# Patient Record
Sex: FEMALE | Race: WHITE | NOT HISPANIC OR LATINO | Employment: FULL TIME | URBAN - METROPOLITAN AREA
[De-identification: names, ages, dates, MRNs, and addresses within clinical notes are randomized per-mention and may not be internally consistent; named-entity substitution may affect disease eponyms.]

---

## 2017-06-07 ENCOUNTER — EXTERNAL ORDER RESULTS (OUTPATIENT)
Dept: HEALTH INFORMATION MANAGEMENT | Facility: CLINIC | Age: 27
End: 2017-06-07

## 2017-06-07 LAB — PAP SMEAR - HIM PATIENT REPORTED: NEGATIVE

## 2017-09-19 ENCOUNTER — OFFICE VISIT (OUTPATIENT)
Dept: FAMILY MEDICINE | Facility: CLINIC | Age: 27
End: 2017-09-19
Payer: COMMERCIAL

## 2017-09-19 VITALS
SYSTOLIC BLOOD PRESSURE: 132 MMHG | OXYGEN SATURATION: 98 % | HEART RATE: 109 BPM | HEIGHT: 65 IN | BODY MASS INDEX: 33.38 KG/M2 | WEIGHT: 200.38 LBS | DIASTOLIC BLOOD PRESSURE: 94 MMHG | TEMPERATURE: 98.2 F

## 2017-09-19 DIAGNOSIS — Z30.41 ENCOUNTER FOR SURVEILLANCE OF CONTRACEPTIVE PILLS: Primary | ICD-10-CM

## 2017-09-19 DIAGNOSIS — E66.09 NON MORBID OBESITY DUE TO EXCESS CALORIES: ICD-10-CM

## 2017-09-19 PROBLEM — Z13.6 CARDIOVASCULAR SCREENING; LDL GOAL LESS THAN 160: Status: ACTIVE | Noted: 2017-09-19

## 2017-09-19 PROCEDURE — 99203 OFFICE O/P NEW LOW 30 MIN: CPT | Performed by: NURSE PRACTITIONER

## 2017-09-19 RX ORDER — DROSPIRENONE AND ETHINYL ESTRADIOL 0.02-3(28)
1 KIT ORAL DAILY
Qty: 84 TABLET | Refills: 3 | Status: SHIPPED | OUTPATIENT
Start: 2017-09-19 | End: 2017-09-29

## 2017-09-19 RX ORDER — DROSPIRENONE AND ETHINYL ESTRADIOL 0.02-3(28)
1 KIT ORAL DAILY
COMMUNITY
End: 2018-08-28

## 2017-09-19 NOTE — Clinical Note
Please abstract the following data from this visit with this patient into the appropriate field in Epic:  Pap smear done on this date: 6/7/17 (approximately), by this group: JERAMIE garay, results were Normal.

## 2017-09-19 NOTE — PROGRESS NOTES
"  SUBJECTIVE:   Kymberly Lugo is a 27 year old female who presents to clinic today for the following health issues:    Medication Followup of Birth control     Taking Medication as prescribed: yes    Side Effects:  None    Medication Helping Symptoms:  yes     Recently moved here from [facility] for husbands job.  Looking for a job as a  in [facility].    Has been on generic Elier for PCOS and feeling much better on this.  Wants to continue this pill.    Physical and pap done 3 [place] ago [place]  MILLY done.     Overweight.  No family or personal history of early MI, CVA or clot.    Non smoker.         Problem list and histories reviewed & adjusted, as indicated.  Additional history: as documented    Reviewed and updated as needed this visit by clinical staffTobacco  Allergies  Meds  Problems  Med Hx  Surg Hx  Fam Hx  Soc Hx        Reviewed and updated as needed this visit by Provider  Tobacco  Allergies  Meds  Problems  Med Hx  Surg Hx  Fam Hx  Soc Hx          ROS:  Constitutional, HEENT, cardiovascular, pulmonary, gi and gu systems are negative, except as otherwise noted.      OBJECTIVE:   BP (!) 132/94 (BP Location: Right arm, Patient Position: Sitting, Cuff Size: Adult Large)  Pulse 109  Temp 98.2  F (36.8  C) (Oral)  Ht 5' 4.5\" (1.638 m)  Wt 200 lb 6 oz (90.9 kg)  LMP  (LMP Unknown)  SpO2 98%  Breastfeeding? No  BMI 33.86 kg/m2  Body mass index is 33.86 kg/(m^2).  GENERAL: healthy, alert and no distress  RESP: lungs clear to auscultation - no rales, rhonchi or wheezes  CV: regular rate and rhythm, normal S1 S2, no S3 or S4, no murmur, click or rub, no peripheral edema and peripheral pulses strong  MS: no gross musculoskeletal defects noted, no edema  SKIN: no suspicious lesions or rashes      ASSESSMENT/PLAN:         ICD-10-CM    1. Encounter for surveillance of contraceptive pills Z30.41 drospirenone-ethinyl estradiol (ELIER) 3-0.02 MG per tablet   2. Non morbid obesity due to excess " calories E66.09      .The use of the oral contraceptive has been fully discussed with the patient.   The patient has been told of the more serious potential side effects such as MI, stroke, and deep vein thrombosis, all of which are very unlikely.  She has been asked to report any signs of such serious problems immediately.  She should back up the pill with a condom during the first cycle.  She has been given written literature regarding use and side effects of oral contraceptives. The need for additional protection, such as a condom, to prevent exposure to sexually transmitted diseases has also been discussed- the patient has been clearly reminded that OCP's cannot protect her against diseases such as HIV and others. She understands and wishes to take the medication as prescribed.      See Patient Instructions    CRISTIAN Pandya CNP  [facility]

## 2017-09-19 NOTE — NURSING NOTE
"Chief Complaint   Patient presents with     Contraception       Initial BP (!) 132/94 (BP Location: Right arm, Patient Position: Sitting, Cuff Size: Adult Large)  Pulse 109  Temp 98.2  F (36.8  C) (Oral)  Ht 5' 4.5\" (1.638 m)  Wt 200 lb 6 oz (90.9 kg)  LMP  (LMP Unknown)  SpO2 98%  Breastfeeding? No  BMI 33.86 kg/m2 Estimated body mass index is 33.86 kg/(m^2) as calculated from the following:    Height as of this encounter: 5' 4.5\" (1.638 m).    Weight as of this encounter: 200 lb 6 oz (90.9 kg).  Medication Reconciliation: complete     Meagan Saleh MA      "

## 2017-09-19 NOTE — MR AVS SNAPSHOT
"              After Visit Summary   2017    Kymberly Lugo    MRN: [id number]           Patient Information     Date Of Birth          1990        Visit Information        Provider Department      2017 9:20 AM Barbara Fitzpatrick APRN CNP [facility]        Today's Diagnoses     Encounter for surveillance of contraceptive pills    -  1    Non morbid obesity due to excess calories           Follow-ups after your visit        Who to contact     If you have questions or need follow up information about today's clinic visit or your schedule please contact [facility] directly at [phone].  Normal or non-critical lab and imaging results will be communicated to you by MyChart, letter or phone within 4 business days after the clinic has received the results. If you do not hear from us within 7 days, please contact the clinic through Sandboxhart or phone. If you have a critical or abnormal lab result, we will notify you by phone as soon as possible.  Submit refill requests through [employer] or call your pharmacy and they will forward the refill request to us. Please allow 3 business days for your refill to be completed.          Additional Information About Your Visit        MyChart Information     [employer] lets you send messages to your doctor, view your test results, renew your prescriptions, schedule appointments and more. To sign up, go to [email or web address] . Click on \"Log in\" on the left side of the screen, which will take you to the Welcome page. Then click on \"Sign up Now\" on the right side of the page.     You will be asked to enter the access code listed below, as well as some personal information. Please follow the directions to create your username and password.     Your access code is: [email or web address]  Expires: 2017  9:52 AM     Your access code will  in 90 days. If you need help or a new code, please call your [facility] or [phone].   " "     Care EveryWhere ID     This is your Care EveryWhere ID. This could be used by other organizations to access your [facility] medical records  [id number]        Your Vitals Were     Pulse Temperature Height Last Period Pulse Oximetry Breastfeeding?    109 98.2  F (36.8  C) (Oral) 5' 4.5\" (1.638 m) (LMP Unknown) 98% No    BMI (Body Mass Index)                   33.86 kg/m2            Blood Pressure from Last 3 Encounters:   09/19/17 (!) 132/94    Weight from Last 3 Encounters:   09/19/17 200 lb 6 oz (90.9 kg)              Today, you had the following     No orders found for display         Today's Medication Changes          These changes are accurate as of: 9/19/17  9:52 AM.  If you have any questions, ask your nurse or doctor.               These medicines have changed or have updated prescriptions.        Dose/Directions    * drospirenone-ethinyl estradiol 3-0.02 MG per tablet   Commonly known as:  ELIER   This may have changed:  Another medication with the same name was added. Make sure you understand how and when to take each.   Changed by:  Barbara Fitzpatrick APRN CNP        Dose:  1 tablet   Take 1 tablet by mouth daily   Refills:  0       * drospirenone-ethinyl estradiol 3-0.02 MG per tablet   Commonly known as:  ELIER   This may have changed:  You were already taking a medication with the same name, and this prescription was added. Make sure you understand how and when to take each.   Used for:  Encounter for surveillance of contraceptive pills   Changed by:  Barbara Fitzpatrick APRN CNP        Dose:  1 tablet   Take 1 tablet by mouth daily   Quantity:  84 tablet   Refills:  3       * Notice:  This list has 2 medication(s) that are the same as other medications prescribed for you. Read the directions carefully, and ask your doctor or other care provider to review them with you.         Where to get your medicines      These medications were sent to [facility] - [place], [place] - [address]  [address] [place] [address]     Phone:  [phone]     drospirenone-ethinyl estradiol 3-0.02 MG per tablet                Primary Care Provider Office Phone # Fax #    CRISTIAN Pandya [phone]       [address] [place]  [facility] [address]        Equal Access to Services     FERNANDO CHAVARRIA : Hadii aad ku hadasho Soomaali, waaxda luqadaha, qaybta kaalmada adeegyada, waxay idiin hayaan adeeg kharash la'ieshan . So [facility] [phone].    ATENCIÓN: Si melanie carreon, tiene a baptiste disposición servicios gratuitos de asistencia lingüística. Ll[facility] [phone].    We comply with applicable federal civil rights laws and [place] laws. We do not discriminate on the basis of race, color, national origin, age, disability sex, sexual orientation or gender identity.            Thank you!     Thank you for choosing [facility]  for your care. Our goal is always to provide you with excellent care. Hearing back from our patients is one way we can continue to improve our services. Please take a few minutes to complete the written survey that you may receive in the mail after your visit with us. Thank you!             Your Updated Medication List - Protect others around you: Learn how to safely use, store and throw away your medicines at www.disposemymeds.org.          This list is accurate as of: 9/19/17  9:52 AM.  Always use your most recent med list.                   Brand Name Dispense Instructions for use Diagnosis    * drospirenone-ethinyl estradiol 3-0.02 MG per tablet    ELIER     Take 1 tablet by mouth daily        * drospirenone-ethinyl estradiol 3-0.02 MG per tablet    ELIER    84 tablet    Take 1 tablet by mouth daily    Encounter for surveillance of contraceptive pills       * Notice:  This list has 2 medication(s) that are the same as other medications prescribed for you. Read the directions carefully, and ask your doctor or other care provider to review  them with you.

## 2017-09-29 DIAGNOSIS — Z30.41 ENCOUNTER FOR SURVEILLANCE OF CONTRACEPTIVE PILLS: ICD-10-CM

## 2017-09-29 NOTE — TELEPHONE ENCOUNTER
[facility] will not transfer Rx's.  They require a new Rx.  Thanks    Medication Detail      Disp Refills Start End TIEN   drospirenone-ethinyl estradiol (ELIER) 3-0.02 MG per tablet 84 tablet 3 9/19/2017  --   Sig: Take 1 tablet by mouth daily       Last Office Visit with F[facility]AGHU, [facility]ANNA or [facility] prescribing provider: 9-19-17

## 2017-10-02 RX ORDER — DROSPIRENONE AND ETHINYL ESTRADIOL 0.02-3(28)
1 KIT ORAL DAILY
Qty: 84 TABLET | Refills: 3 | Status: SHIPPED | OUTPATIENT
Start: 2017-10-02 | End: 2018-08-12

## 2017-10-02 NOTE — TELEPHONE ENCOUNTER
Prescription approved per [facility] Refill Protocol.  Meaghan Dominguez RN

## 2017-10-10 ENCOUNTER — TELEPHONE (OUTPATIENT)
Dept: FAMILY MEDICINE | Facility: CLINIC | Age: 27
End: 2017-10-10

## 2017-10-10 NOTE — TELEPHONE ENCOUNTER
[facility] wants to switch to doing everything online. Sent to us a form on 09/29 for Barbara to fill out and fax back, please advise. Thank you!

## 2017-10-11 NOTE — TELEPHONE ENCOUNTER
Pt states these forms were just refaxed. If we do not receive them we may call [facility] at [phone] opt 2.    Alina Black  Patient Representative

## 2017-10-11 NOTE — TELEPHONE ENCOUNTER
Have not seen any forms from [employer]. Called and spoke with patient to have her resend the forms via fax.     Madeleine Coelho MA

## 2017-10-12 NOTE — TELEPHONE ENCOUNTER
Message below is very vague. I called [facility]. Form appears is a request for medication [phone].  I was transferred several times.  I called in Rx for Christiana.    Radha Neville MA

## 2018-03-01 ENCOUNTER — OFFICE VISIT (OUTPATIENT)
Dept: FAMILY MEDICINE | Facility: CLINIC | Age: 28
End: 2018-03-01
Payer: COMMERCIAL

## 2018-03-01 VITALS
HEART RATE: 145 BPM | SYSTOLIC BLOOD PRESSURE: 126 MMHG | BODY MASS INDEX: 31.32 KG/M2 | DIASTOLIC BLOOD PRESSURE: 86 MMHG | OXYGEN SATURATION: 98 % | TEMPERATURE: 99.2 F | WEIGHT: 188 LBS | HEIGHT: 65 IN

## 2018-03-01 DIAGNOSIS — J06.9 VIRAL URI: ICD-10-CM

## 2018-03-01 DIAGNOSIS — J45.21 MILD INTERMITTENT ASTHMA WITH EXACERBATION: Primary | ICD-10-CM

## 2018-03-01 PROCEDURE — 99214 OFFICE O/P EST MOD 30 MIN: CPT | Performed by: FAMILY MEDICINE

## 2018-03-01 RX ORDER — ALBUTEROL SULFATE 90 UG/1
2 AEROSOL, METERED RESPIRATORY (INHALATION) EVERY 6 HOURS
COMMUNITY
End: 2018-03-01

## 2018-03-01 RX ORDER — ALBUTEROL SULFATE 90 UG/1
2 AEROSOL, METERED RESPIRATORY (INHALATION) EVERY 6 HOURS
Qty: 1 INHALER | Refills: 3 | Status: SHIPPED | OUTPATIENT
Start: 2018-03-01 | End: 2020-03-20

## 2018-03-01 NOTE — PROGRESS NOTES
Subjective: Patient developed a mild cold on Monday, 3 days ago, and yesterday felt chest tightness, asthma symptoms, has a history of asthma and has an old  inhaler which did help.  Not much mucus is coming up.  She used to have very bad asthma in her teen years but seemed to outgrow it.  [place] the city in [place].    Objective: ENT is normal.  Neck is normal.  Lungs are clear but tight cough with forced expiration.  Heart with a pulse of 120 but she had Sudafed earlier today.    Assessment and plan: Asthma exacerbation due to a viral URI.  Albuterol as needed.  Avoid Sudafed because of the rapid heart rate which it appears to cause.  Follow-up with her regular doctor at a physical and discuss more about asthma.

## 2018-03-01 NOTE — MR AVS SNAPSHOT
"              After Visit Summary   3/1/2018    Kymberly Lugo    MRN: [id number]           Patient Information     Date Of Birth          1990        Visit Information        Provider Department      3/1/2018 3:00 PM Balta Cohn MD [facility]        Today's Diagnoses     Mild intermittent asthma with exacerbation    -  1    Viral URI           Follow-ups after your visit        Who to contact     If you have questions or need follow up information about today's clinic visit or your schedule please contact [facility] directly at [phone].  Normal or non-critical lab and imaging results will be communicated to you by DiversityDoctorhart, letter or phone within 4 business days after the clinic has received the results. If you do not hear from us within 7 days, please contact the clinic through [employer] or phone. If you have a critical or abnormal lab result, we will notify you by phone as soon as possible.  Submit refill requests through [employer] or call your pharmacy and they will forward the refill request to us. Please allow 3 business days for your refill to be completed.          Additional Information About Your Visit        MyChart Information     [employer] gives you secure access to your electronic health record. If you see a primary care provider, you can also send messages to your care team and make appointments. If you have questions, please call your primary care clinic.  If you do not have a primary care provider, please call [phone] and they will assist you.        Care EveryWhere ID     This is your Care EveryWhere ID. This could be used by other organizations to access your [facility] medical records  [id number]        Your Vitals Were     Pulse Temperature Height Last Period Pulse Oximetry BMI (Body Mass Index)    145 99.2  F (37.3  C) (Oral) 5' 5\" (1.651 m) 02/15/2018 98% 31.28 kg/m2       Blood Pressure from Last 3 Encounters:   03/01/18 126/86 "   09/19/17 (!) 132/94    Weight from Last 3 Encounters:   03/01/18 188 lb (85.3 kg)   09/19/17 200 lb 6 oz (90.9 kg)              Today, you had the following     No orders found for display         Where to get your medicines      These medications were sent to [facility] - [place], [place] - [address]  [address] [facility], [place] [address]     Phone:  [phone]     albuterol 108 (90 BASE) MCG/ACT Inhaler          Primary Care Provider Office Phone # Fax #    CRISTIAN Pandya [phone]       [address] [facility] [address]        Equal Access to Services     FERNANDO CHAVARRIA : Davonte llanoso Solis, waaxda luqadaha, qaybta kaalmada adeegyada, td skelton . So [facility] [phone].    ATENCIÓN: Si habla español, tiene a baptiste disposición servicios gratuitos de asistencia lingüística. Ll[facility] [phone].    We comply with applicable federal civil rights laws and [place] laws. We do not discriminate on the basis of race, color, national origin, age, disability, sex, sexual orientation, or gender identity.            Thank you!     Thank you for choosing [facility]  for your care. Our goal is always to provide you with excellent care. Hearing back from our patients is one way we can continue to improve our services. Please take a few minutes to complete the written survey that you may receive in the mail after your visit with us. Thank you!             Your Updated Medication List - Protect others around you: Learn how to safely use, store and throw away your medicines at www.disposemymeds.org.          This list is accurate as of 3/1/18  4:29 PM.  Always use your most recent med list.                   Brand Name Dispense Instructions for use Diagnosis    albuterol 108 (90 BASE) MCG/ACT Inhaler    PROAIR HFA/PROVENTIL HFA/VENTOLIN HFA    1 Inhaler    Inhale 2 puffs into the lungs every 6 hours    Mild  intermittent asthma with exacerbation       * drospirenone-ethinyl estradiol 3-0.02 MG per tablet    ELIER     Take 1 tablet by mouth daily        * drospirenone-ethinyl estradiol 3-0.02 MG per tablet    ELIER    84 tablet    Take 1 tablet by mouth daily    Encounter for surveillance of contraceptive pills       MULTIVITAMIN GUMMIES ADULT PO           VITAMIN D (CHOLECALCIFEROL) PO      Take by mouth daily        * Notice:  This list has 2 medication(s) that are the same as other medications prescribed for you. Read the directions carefully, and ask your doctor or other care provider to review them with you.

## 2018-03-01 NOTE — NURSING NOTE
"Chief Complaint   Patient presents with     Cough     Pt in clinic to have eval for cough for 4 days and sore throat that started last night     Pharyngitis       Initial /86  Pulse 145  Temp 99.2  F (37.3  C) (Oral)  Ht 1.651 m (5' 5\")  Wt 85.3 kg (188 lb)  LMP 02/15/2018  SpO2 98%  BMI 31.28 kg/m2 Estimated body mass index is 31.28 kg/(m^2) as calculated from the following:    Height as of this encounter: 1.651 m (5' 5\").    Weight as of this encounter: 85.3 kg (188 lb).  Medication Reconciliation: complete   Chloe Arauz/ MA    "

## 2018-04-17 ENCOUNTER — TELEPHONE (OUTPATIENT)
Dept: FAMILY MEDICINE | Facility: CLINIC | Age: 28
End: 2018-04-17

## 2018-06-19 ENCOUNTER — TELEPHONE (OUTPATIENT)
Dept: FAMILY MEDICINE | Facility: CLINIC | Age: 28
End: 2018-06-19

## 2018-06-20 ASSESSMENT — ASTHMA QUESTIONNAIRES: ACT_TOTALSCORE: 22

## 2018-08-12 DIAGNOSIS — Z30.41 ENCOUNTER FOR SURVEILLANCE OF CONTRACEPTIVE PILLS: ICD-10-CM

## 2018-08-13 NOTE — TELEPHONE ENCOUNTER
"Requested Prescriptions   Pending Prescriptions Disp Refills     drospirenone-ethinyl estradiol (ELIER) 3-0.02 MG per tablet [Pharmacy Med Name: DROSPIRENONE/ETH ES TABS 28'S 3/20]  Last Written Prescription Date:  10-2-17  Last Fill Quantity: 84 tab,  # refills: 3   Last office visit: 3/1/2018 with prescribing provider:  Barbara Fitzpatrick    Future Office Visit:    84 tablet 3     Sig: TAKE 1 TABLET DAILY    Contraceptives Protocol Passed    8/12/2018 10:03 PM       Passed - Patient is not a current smoker if age is 35 or older       Passed - Recent (12 mo) or future (30 days) visit within the authorizing provider's specialty    Patient had office visit in the last 12 months or has a visit in the next 30 days with authorizing provider or within the authorizing provider's specialty.  See \"Patient Info\" tab in inbasket, or \"Choose Columns\" in Meds & Orders section of the refill encounter.           Passed - No active pregnancy on record       Passed - No positive pregnancy test in past 12 months          "

## 2018-08-20 RX ORDER — DROSPIRENONE AND ETHINYL ESTRADIOL 0.02-3(28)
KIT ORAL
Qty: 84 TABLET | Refills: 1 | Status: SHIPPED | OUTPATIENT
Start: 2018-08-20 | End: 2018-08-28

## 2018-08-20 NOTE — TELEPHONE ENCOUNTER
Prescription approved per [facility] Refill Protocol.  Adwoa Flores RN

## 2018-08-28 ENCOUNTER — OFFICE VISIT (OUTPATIENT)
Dept: FAMILY MEDICINE | Facility: CLINIC | Age: 28
End: 2018-08-28
Payer: COMMERCIAL

## 2018-08-28 VITALS
TEMPERATURE: 98.4 F | BODY MASS INDEX: 32.48 KG/M2 | OXYGEN SATURATION: 98 % | RESPIRATION RATE: 16 BRPM | DIASTOLIC BLOOD PRESSURE: 71 MMHG | HEART RATE: 93 BPM | WEIGHT: 195.2 LBS | SYSTOLIC BLOOD PRESSURE: 110 MMHG

## 2018-08-28 DIAGNOSIS — Z30.41 ENCOUNTER FOR SURVEILLANCE OF CONTRACEPTIVE PILLS: ICD-10-CM

## 2018-08-28 DIAGNOSIS — B37.31 YEAST INFECTION OF THE VAGINA: ICD-10-CM

## 2018-08-28 DIAGNOSIS — R30.0 DYSURIA: Primary | ICD-10-CM

## 2018-08-28 DIAGNOSIS — B96.89 BV (BACTERIAL VAGINOSIS): ICD-10-CM

## 2018-08-28 DIAGNOSIS — N76.0 BV (BACTERIAL VAGINOSIS): ICD-10-CM

## 2018-08-28 LAB
SPECIMEN SOURCE: ABNORMAL
WET PREP SPEC: ABNORMAL

## 2018-08-28 PROCEDURE — 87210 SMEAR WET MOUNT SALINE/INK: CPT | Performed by: NURSE PRACTITIONER

## 2018-08-28 PROCEDURE — 99214 OFFICE O/P EST MOD 30 MIN: CPT | Performed by: NURSE PRACTITIONER

## 2018-08-28 RX ORDER — DROSPIRENONE AND ETHINYL ESTRADIOL 0.02-3(28)
1 KIT ORAL DAILY
Qty: 84 TABLET | Refills: 3 | Status: SHIPPED | OUTPATIENT
Start: 2018-08-28 | End: 2019-08-31

## 2018-08-28 RX ORDER — METRONIDAZOLE 500 MG/1
500 TABLET ORAL 2 TIMES DAILY
Qty: 14 TABLET | Refills: 0 | Status: SHIPPED | OUTPATIENT
Start: 2018-08-28 | End: 2019-10-08

## 2018-08-28 RX ORDER — FLUCONAZOLE 150 MG/1
150 TABLET ORAL ONCE
Qty: 1 TABLET | Refills: 0 | Status: SHIPPED | OUTPATIENT
Start: 2018-08-28 | End: 2019-02-22

## 2018-08-28 NOTE — PROGRESS NOTES
SUBJECTIVE:   Kymberly Lugo is a 28 year old female who presents to clinic today for the following health issues:        Medication Followup of  Christiana    Taking Medication as prescribed: yes    Side Effects:  None    Medication Helping Symptoms:  yes       Vaginal Symptoms      Duration: 2 day    Description  vaginal discharge - white and itching    Intensity:  mild    Accompanying signs and symptoms (fever/dysuria/abdominal or back pain): None    History  Sexually active: yes, single partner, contraception - condoms  Possibility of pregnancy: No  Recent antibiotic use: no     Precipitating or alleviating factors: None    Therapies tried and outcome: none   Outcome: none      Problem list and histories reviewed & adjusted, as indicated.  Additional history: as documented      Reviewed and updated as needed this visit by clinical staff  Tobacco  Allergies  Meds  Problems  Med Hx  Surg Hx  Fam Hx  Soc Hx        Reviewed and updated as needed this visit by Provider  Tobacco  Allergies  Meds  Problems  Med Hx  Surg Hx  Fam Hx  Soc Hx          ROS:  Constitutional, HEENT, cardiovascular, pulmonary, GI, , musculoskeletal, neuro, skin, endocrine and psych systems are negative, except as otherwise noted.    OBJECTIVE:     /71  Pulse 93  Temp 98.4  F (36.9  C) (Oral)  Resp 16  Wt 195 lb 3.2 oz (88.5 kg)  LMP 07/31/2018  SpO2 98%  BMI 32.48 kg/m2  Body mass index is 32.48 kg/(m^2).  GENERAL: healthy, alert and no distress  EYES: Eyes grossly normal to inspection, PERRL and conjunctivae and sclerae normal  HENT: ear canals and TM's normal, nose and mouth without ulcers or lesions  NECK: no adenopathy, no asymmetry, masses, or scars and thyroid normal to palpation  RESP: lungs clear to auscultation - no rales, rhonchi or wheezes  CV: regular rate and rhythm, normal S1 S2, no S3 or S4, no murmur, click or rub, no peripheral edema and peripheral pulses strong  ABDOMEN: soft, nontender, no  hepatosplenomegaly, no masses and bowel sounds normal  MS: no gross musculoskeletal defects noted, no edema  SKIN: no suspicious lesions or rashes  NEURO: Normal strength and tone, mentation intact and speech normal  PSYCH: mentation appears normal, affect normal/bright    Diagnostic Test Results:  Results for orders placed or performed in visit on 08/28/18   Wet prep   Result Value Ref Range    Specimen Description Vagina     Wet Prep No Trichomonas seen     Wet Prep Clue cells seen (A)     Wet Prep Yeast seen (A)        ASSESSMENT/PLAN:       ICD-10-CM    1. Dysuria R30.0 Wet prep   2. Encounter for surveillance of contraceptive pills Z30.41 drospirenone-ethinyl estradiol (ELIER) 3-0.02 MG per tablet   3. BV (bacterial vaginosis) N76.0 metroNIDAZOLE (FLAGYL) 500 MG tablet    B96.89    4. Yeast infection of the vagina B37.3 fluconazole (DIFLUCAN) 150 MG tablet     Your wet prep shows that you have bacterial vaginosis (overgrowth of a bacteria called clue cells).  This is not a sexually transmitted disease.  I sent in a prescription for Flagyl which is an antibiotic that you will take twice daily for one week.  You cannot drink alcohol while taking this medication.   Diflucan x 1 for yeast.      The use of the oral contraceptive has been fully discussed with the patient.   The patient has been told of the more serious potential side effects such as MI, stroke, and deep vein thrombosis, all of which are very unlikely.  She has been asked to report any signs of such serious problems immediately.  She should back up the pill with a condom during the first cycle.  She has been given written literature regarding use and side effects of oral contraceptives. The need for additional protection, such as a condom, to prevent exposure to sexually transmitted diseases has also been discussed- the patient has been clearly reminded that OCP's cannot protect her against diseases such as HIV and others. She understands and wishes to  take the medication as prescribed.      See Patient Instructions    CRISTIAN Pandya CNP  [facility]

## 2018-08-28 NOTE — MR AVS SNAPSHOT
After Visit Summary   8/28/2018    Kymberly Lugo    MRN: [id number]           Patient Information     Date Of Birth          1990        Visit Information        Provider Department      8/28/2018 9:40 AM Barbara Fitzpatrick APRN CNP [facility]        Today's Diagnoses     Dysuria    -  1    Encounter for surveillance of contraceptive pills        BV (bacterial vaginosis)        Yeast infection of the vagina           Follow-ups after your visit        Who to contact     If you have questions or need follow up information about today's clinic visit or your schedule please contact [facility] directly at [phone].  Normal or non-critical lab and imaging results will be communicated to you by [employer]IOhart, letter or phone within 4 business days after the clinic has received the results. If you do not hear from us within 7 days, please contact the clinic through [employer]IOh[employer] or phone. If you have a critical or abnormal lab result, we will notify you by phone as soon as possible.  Submit refill requests through [employer] or call your pharmacy and they will forward the refill request to us. Please allow 3 business days for your refill to be completed.          Additional Information About Your Visit        MyChart Information     [employer] gives you secure access to your electronic health record. If you see a primary care provider, you can also send messages to your care team and make appointments. If you have questions, please call your primary care clinic.  If you do not have a primary care provider, please call [phone] and they will assist you.        Care EveryWhere ID     This is your Care EveryWhere ID. This could be used by other organizations to access your [facility] medical records  [id number]        Your Vitals Were     Pulse Temperature Respirations Last Period Pulse Oximetry BMI (Body Mass Index)    93 98.4  F (36.9  C) (Oral) 16 07/31/2018 98% 32.48  kg/m2       Blood Pressure from Last 3 Encounters:   08/28/18 110/71   03/01/18 126/86   09/19/17 (!) 132/94    Weight from Last 3 Encounters:   08/28/18 195 lb 3.2 oz (88.5 kg)   03/01/18 188 lb (85.3 kg)   09/19/17 200 lb 6 oz (90.9 kg)              We Performed the Following     Wet prep          Today's Medication Changes          These changes are accurate as of 8/28/18 11:59 PM.  If you have any questions, ask your nurse or doctor.               Start taking these medicines.        Dose/Directions    fluconazole 150 MG tablet   Commonly known as:  DIFLUCAN   Used for:  Yeast infection of the vagina   Started by:  Barbara Fitzpatrick APRN CNP        Dose:  150 mg   Take 1 tablet (150 mg) by mouth once for 1 dose   Quantity:  1 tablet   Refills:  0       metroNIDAZOLE 500 MG tablet   Commonly known as:  FLAGYL   Used for:  BV (bacterial vaginosis)   Started by:  Barbara Fitzpatrick APRN CNP        Dose:  500 mg   Take 1 tablet (500 mg) by mouth 2 times daily   Quantity:  14 tablet   Refills:  0            Where to get your medicines      These medications were sent to [facility] [place] [address]     Phone:  [phone]     drospirenone-ethinyl estradiol 3-0.02 MG per tablet         These medications were sent to [facility] - [place], [place] - [address], [place] [address]     Phone:  [phone]     fluconazole 150 MG tablet    metroNIDAZOLE 500 MG tablet                Primary Care Provider Office Phone # Fax #    CRISTIAN Pandya [phone]       [address] [place] [address]        Equal Access to Services     FERNANDO CHAVARRIA AH: Davonte Arora, wamary mosley, qaybta kaalmada fiona, td zuniga. Nia [facility] [phone].    ATENCIÓN: Si habla español, tiene a baptiste disposición servicios gratuitos de  asistencia lingüística. Josh al [phone].    We comply with applicable federal civil rights laws and [place] laws. We do not discriminate on the basis of race, color, national origin, age, disability, sex, sexual orientation, or gender identity.            Thank you!     Thank you for choosing [facility]  for your care. Our goal is always to provide you with excellent care. Hearing back from our patients is one way we can continue to improve our services. Please take a few minutes to complete the written survey that you may receive in the mail after your visit with us. Thank you!             Your Updated Medication List - Protect others around you: Learn how to safely use, store and throw away your medicines at www.disposemymeds.org.          This list is accurate as of 8/28/18 11:59 PM.  Always use your most recent med list.                   Brand Name Dispense Instructions for use Diagnosis    albuterol 108 (90 Base) MCG/ACT inhaler    PROAIR HFA/PROVENTIL HFA/VENTOLIN HFA    1 Inhaler    Inhale 2 puffs into the lungs every 6 hours    Mild intermittent asthma with exacerbation       drospirenone-ethinyl estradiol 3-0.02 MG per tablet    ELIER    84 tablet    Take 1 tablet by mouth daily    Encounter for surveillance of contraceptive pills       fluconazole 150 MG tablet    DIFLUCAN    1 tablet    Take 1 tablet (150 mg) by mouth once for 1 dose    Yeast infection of the vagina       metroNIDAZOLE 500 MG tablet    FLAGYL    14 tablet    Take 1 tablet (500 mg) by mouth 2 times daily    BV (bacterial vaginosis)       MULTIVITAMIN GUMMIES ADULT PO           VITAMIN D (CHOLECALCIFEROL) PO      Take by mouth daily

## 2018-08-28 NOTE — LETTER
My Asthma Action Plan  Name: Kymberly Lugo   YOB: 1990  Date: 8/28/2018   My doctor: CRISTIAN Pandya CNP   My clinic: [facility]        My Control Medicine: { :268169}  My Rescue Medicine: { :575547}  {AAP include Oral Steroid:391934} My Asthma Severity: { :133296}  Avoid your asthma triggers: { :442853}        {Is patient a child or adult?:719713}       GREEN ZONE   Good Control    I feel good    No cough or wheeze    Can work, sleep and play without asthma symptoms       Take your asthma control medicine every day.     1. If exercise triggers your asthma, take your rescue medication    15 minutes before exercise or sports, and    During exercise if you have asthma symptoms  2. Spacer to use with inhaler: If you have a spacer, make sure to use it with your inhaler             YELLOW ZONE Getting Worse  I have ANY of these:    I do not feel good    Cough or wheeze    Chest feels tight    Wake up at night   1. Keep taking your Green Zone medications  2. Start taking your rescue medicine:    every 20 minutes for up to 1 hour. Then every 4 hours for 24-48 hours.  3. If you stay in the Yellow Zone for more than 12-24 hours, contact your doctor.  4. If you do not return to the Green Zone in 12-24 hours or you get worse, start taking your oral steroid medicine if prescribed by your provider.           RED ZONE Medical Alert - Get Help  I have ANY of these:    I feel awful    Medicine is not helping    Breathing getting harder    Trouble walking or talking    Nose opens wide to breathe       1. Take your rescue medicine NOW  2. If your provider has prescribed an oral steroid medicine, start taking it NOW  3. Call your doctor NOW  4. If you are still in the Red Zone after 20 minutes and you have not reached your doctor:    Take your rescue medicine again and    Call 911 or go to the emergency room right away    See your regular doctor within 2 weeks of an Emergency Room or  Urgent Care visit for follow-up treatment.          Annual Reminders:  Meet with Asthma Educator,  Flu Shot in the Fall, consider Pneumonia Vaccination for patients with asthma (aged 19 and older).    Pharmacy:    [facility] - USE FOR MAILING ONLY - [place], [place]  [facility] - [place], [place] - [address]                      Asthma Triggers  How To Control Things That Make Your Asthma Worse    Triggers are things that make your asthma worse.  Look at the list below to help you find your triggers and what you can do about them.  You can help prevent asthma flare-ups by staying away from your triggers.      Trigger                                                          What you can do   Cigarette Smoke  Tobacco smoke can make asthma worse. Do not allow smoking in your home, car or around you.  Be sure no one smokes at a child s day care or school.  If you smoke, ask your health care provider for ways to help you quit.  Ask family members to quit too.  Ask your health care provider for a referral to Quit Plan to help you quit smoking, or call 5-946-334-PLAN.     Colds, Flu, Bronchitis  These are common triggers of asthma. Wash your hands often.  Don t touch your eyes, nose or mouth.  Get a flu shot every year.     Dust Mites  These are tiny bugs that live in cloth or carpet. They are too small to see. Wash sheets and blankets in hot water every week.   Encase pillows and mattress in dust mite proof covers.  Avoid having carpet if you can. If you have carpet, vacuum weekly.   Use a dust mask and HEPA vacuum.   Pollen and Outdoor Mold  Some people are allergic to trees, grass, or weed pollen, or molds. Try to keep your windows closed.  Limit time out doors when pollen count is high.   Ask you health care provider about taking medicine during allergy season.     Animal Dander  Some people are allergic to skin flakes, urine or saliva from pets with fur or feathers. Keep pets with fur  or feathers out of your home.    If you can t keep the pet outdoors, then keep the pet out of your bedroom.  Keep the bedroom door closed.  Keep pets off cloth furniture and away from stuffed toys.     Mice, Rats, and Cockroaches  Some people are allergic to the waste from these pests.   Cover food and garbage.  Clean up spills and food crumbs.  Store grease in the refrigerator.   Keep food out of the bedroom.   Indoor Mold  This can be a trigger if your home has high moisture. Fix leaking faucets, pipes, or other sources of water.   Clean moldy surfaces.  Dehumidify basement if it is damp and smelly.   Smoke, Strong Odors, and Sprays  These can reduce air quality. Stay away from strong odors and sprays, such as perfume, powder, hair spray, paints, smoke incense, paint, cleaning products, candles and new carpet.   Exercise or Sports  Some people with asthma have this trigger. Be active!  Ask your doctor about taking medicine before sports or exercise to prevent symptoms.    Warm up for 5-10 minutes before and after sports or exercise.     Other Triggers of Asthma  Cold air:  Cover your nose and mouth with a scarf.  Sometimes laughing or crying can be a trigger.  Some medicines and food can trigger asthma.

## 2018-08-28 NOTE — LETTER
My Asthma Action Plan  Name: Kymberly Lugo   YOB: 1990  Date: 8/28/2018   My doctor: CRISTIAN Pandya CNP   My clinic: [facility]        My Control Medicine: { :845354}  My Rescue Medicine: { :590177}  {AAP include Oral Steroid:498296} My Asthma Severity: { :043522}  Avoid your asthma triggers: { :406845}        {Is patient a child or adult?:645963}       GREEN ZONE   Good Control    I feel good    No cough or wheeze    Can work, sleep and play without asthma symptoms       Take your asthma control medicine every day.     1. If exercise triggers your asthma, take your rescue medication    15 minutes before exercise or sports, and    During exercise if you have asthma symptoms  2. Spacer to use with inhaler: If you have a spacer, make sure to use it with your inhaler             YELLOW ZONE Getting Worse  I have ANY of these:    I do not feel good    Cough or wheeze    Chest feels tight    Wake up at night   1. Keep taking your Green Zone medications  2. Start taking your rescue medicine:    every 20 minutes for up to 1 hour. Then every 4 hours for 24-48 hours.  3. If you stay in the Yellow Zone for more than 12-24 hours, contact your doctor.  4. If you do not return to the Green Zone in 12-24 hours or you get worse, start taking your oral steroid medicine if prescribed by your provider.           RED ZONE Medical Alert - Get Help  I have ANY of these:    I feel awful    Medicine is not helping    Breathing getting harder    Trouble walking or talking    Nose opens wide to breathe       1. Take your rescue medicine NOW  2. If your provider has prescribed an oral steroid medicine, start taking it NOW  3. Call your doctor NOW  4. If you are still in the Red Zone after 20 minutes and you have not reached your doctor:    Take your rescue medicine again and    Call 911 or go to the emergency room right away    See your regular doctor within 2 weeks of an Emergency Room or  Urgent Care visit for follow-up treatment.          Annual Reminders:  Meet with Asthma Educator,  Flu Shot in the Fall, consider Pneumonia Vaccination for patients with asthma (aged 19 and older).    Pharmacy:    [facility] - [facility] FOR MAIL[facility] ONLY - [place], [place]  [facility] - [place], [place] - [address]                      Asthma Triggers  How To Control Things That Make Your Asthma Worse    Triggers are things that make your asthma worse.  Look at the list below to help you find your triggers and what you can do about them.  You can help prevent asthma flare-ups by staying away from your triggers.      Trigger                                                          What you can do   Cigarette Smoke  Tobacco smoke can make asthma worse. Do not allow smoking in your home, car or around you.  Be sure no one smokes at a child s day care or school.  If you smoke, ask your health care provider for ways to help you quit.  Ask family members to quit too.  Ask your health care provider for a referral to Quit Plan to help you quit smoking, or call 6-988-653-PLAN.     Colds, Flu, Bronchitis  These are common triggers of asthma. Wash your hands often.  Don t touch your eyes, nose or mouth.  Get a flu shot every year.     Dust Mites  These are tiny bugs that live in cloth or carpet. They are too small to see. Wash sheets and blankets in hot water every week.   Encase pillows and mattress in dust mite proof covers.  Avoid having carpet if you can. If you have carpet, vacuum weekly.   Use a dust mask and HEPA vacuum.   Pollen and Outdoor Mold  Some people are allergic to trees, grass, or weed pollen, or molds. Try to keep your windows closed.  Limit time out doors when pollen count is high.   Ask you health care provider about taking medicine during allergy season.     Animal Dander  Some people are allergic to skin flakes, urine or saliva from pets with fur or feathers. Keep pets with fur  or feathers out of your home.    If you can t keep the pet outdoors, then keep the pet out of your bedroom.  Keep the bedroom door closed.  Keep pets off cloth furniture and away from stuffed toys.     Mice, Rats, and Cockroaches  Some people are allergic to the waste from these pests.   Cover food and garbage.  Clean up spills and food crumbs.  Store grease in the refrigerator.   Keep food out of the bedroom.   Indoor Mold  This can be a trigger if your home has high moisture. Fix leaking faucets, pipes, or other sources of water.   Clean moldy surfaces.  Dehumidify basement if it is damp and smelly.   Smoke, Strong Odors, and Sprays  These can reduce air quality. Stay away from strong odors and sprays, such as perfume, powder, hair spray, paints, smoke incense, paint, cleaning products, candles and new carpet.   Exercise or Sports  Some people with asthma have this trigger. Be active!  Ask your doctor about taking medicine before sports or exercise to prevent symptoms.    Warm up for 5-10 minutes before and after sports or exercise.     Other Triggers of Asthma  Cold air:  Cover your nose and mouth with a scarf.  Sometimes laughing or crying can be a trigger.  Some medicines and food can trigger asthma.

## 2019-02-22 ENCOUNTER — OFFICE VISIT (OUTPATIENT)
Dept: FAMILY MEDICINE | Facility: CLINIC | Age: 29
End: 2019-02-22
Payer: COMMERCIAL

## 2019-02-22 VITALS
RESPIRATION RATE: 16 BRPM | SYSTOLIC BLOOD PRESSURE: 115 MMHG | OXYGEN SATURATION: 97 % | TEMPERATURE: 98.2 F | HEART RATE: 88 BPM | WEIGHT: 197 LBS | HEIGHT: 64 IN | BODY MASS INDEX: 33.63 KG/M2 | DIASTOLIC BLOOD PRESSURE: 70 MMHG

## 2019-02-22 DIAGNOSIS — R10.84 ABDOMINAL PAIN, GENERALIZED: Primary | ICD-10-CM

## 2019-02-22 PROCEDURE — 99214 OFFICE O/P EST MOD 30 MIN: CPT | Performed by: NURSE PRACTITIONER

## 2019-02-22 ASSESSMENT — MIFFLIN-ST. JEOR: SCORE: 1613.59

## 2019-02-22 NOTE — PROGRESS NOTES
"  SUBJECTIVE:   Kymberly Lugo is a 28 year old female who presents to clinic today for the following health issues:    Abdominal Pain      Duration: 3 DAYS with this flare but happening intermittently for 10 years...       Associated flank pain: None    Intensity:  mild, moderate    Accompanying signs and symptoms:        Fever/Chills: no        Gas/Bloating: YES- Bloating       Nausea/vomitting: no        Diarrhea: YES       Dysuria or Hematuria: no     History (previous similar pain/trauma/previous testing): yes     Precipitating or alleviating factors:       Pain worse with eating/BM/urination: none       Pain relieved by BM: YES    Therapies tried and outcome: None    LMP:  not applicable    Long history of chronic constipation  Uses magnesium citrate periodically to clear out colon.    Reports a very good diet.    prepares most foods.    Very little processed foods.    Lots of bananas and rice.  Some dairy.    Wants to get formal eval to figure out why she has consistent RLQ abd pains.      Problem list and histories reviewed & adjusted, as indicated.  Additional history: as documented    Reviewed and updated as needed this visit by clinical staff  Tobacco  Allergies  Meds  Problems  Med Hx  Surg Hx  Fam Hx  Soc Hx        Reviewed and updated as needed this visit by Provider  Tobacco  Allergies  Meds  Problems  Med Hx  Surg Hx  Fam Hx         ROS:  Constitutional, HEENT, cardiovascular, pulmonary, GI, , musculoskeletal, neuro, skin, endocrine and psych systems are negative, except as otherwise noted.    OBJECTIVE:     /70   Pulse 88   Temp 98.2  F (36.8  C) (Oral)   Resp 16   Ht 1.626 m (5' 4\")   Wt 89.4 kg (197 lb)   SpO2 97%   BMI 33.81 kg/m    Body mass index is 33.81 kg/m .  GENERAL: healthy, alert and no distress  RESP: lungs clear to auscultation - no rales, rhonchi or wheezes  CV: regular rate and rhythm, normal S1 S2, no S3 or S4, no murmur, click or rub, no peripheral " "edema and peripheral pulses strong  ABDOMEN: soft, nontender, no hepatosplenomegaly, no masses and bowel sounds normal  MS: no gross musculoskeletal defects noted, no edema  SKIN: no suspicious lesions or rashes      ASSESSMENT/PLAN:       ICD-10-CM    1. Abdominal pain, generalized R10.84 GASTROENTEROLOGY ADULT REF CONSULT ONLY     Recommend she avoid BRAT foods and dairy.    Non-constipating diet discussed.  Increase fiber and fluid and decrease milk and cheese intake.     Warm baths, belly massage, motion of bicycling legs.    Eat \"P\" fruits - pear, prune, pineapple, papaya, peach can help soften stool.    Drink plenty of fluids.     Follow up with GI to eval why this is chronically happening.    Call with any questions or concerns.      See Patient Instructions    CRISTIAN Pandya [facility]    "

## 2019-02-23 ASSESSMENT — ASTHMA QUESTIONNAIRES: ACT_TOTALSCORE: 24

## 2019-03-14 ENCOUNTER — TRANSFERRED RECORDS (OUTPATIENT)
Dept: HEALTH INFORMATION MANAGEMENT | Facility: CLINIC | Age: 29
End: 2019-03-14

## 2019-04-04 ENCOUNTER — HOSPITAL ENCOUNTER (OUTPATIENT)
Dept: CT IMAGING | Facility: CLINIC | Age: 29
Discharge: HOME | End: 2019-04-04
Attending: INTERNAL MEDICINE | Admitting: INTERNAL MEDICINE
Payer: COMMERCIAL

## 2019-04-04 DIAGNOSIS — R19.4 CHANGE IN BOWEL HABITS: ICD-10-CM

## 2019-04-04 DIAGNOSIS — R10.31 RLQ ABDOMINAL PAIN: ICD-10-CM

## 2019-04-04 PROCEDURE — 25000128 H RX IP 250 OP 636: Performed by: INTERNAL MEDICINE

## 2019-04-04 PROCEDURE — 74177 CT ABD & PELVIS W/CONTRAST: CPT

## 2019-04-04 RX ORDER — IOPAMIDOL 755 MG/ML
120 INJECTION, SOLUTION INTRAVASCULAR ONCE
Status: COMPLETED | OUTPATIENT
Start: 2019-04-04 | End: 2019-04-04

## 2019-04-04 RX ADMIN — IOPAMIDOL 120 ML: 755 INJECTION, SOLUTION INTRAVENOUS at 14:24

## 2019-04-11 ENCOUNTER — TRANSFERRED RECORDS (OUTPATIENT)
Dept: HEALTH INFORMATION MANAGEMENT | Facility: CLINIC | Age: 29
End: 2019-04-11

## 2019-08-31 DIAGNOSIS — Z30.41 ENCOUNTER FOR SURVEILLANCE OF CONTRACEPTIVE PILLS: ICD-10-CM

## 2019-08-31 NOTE — LETTER
[facility]  [address] [address]  Phone: [phone]    09/03/19    Kymberly Lugo  [address]  [place] [address]      To whom it may concern:     In order to ensure we are providing the best quality care, we have reviewed your chart and see that you are due for an annual physical for further refills.    We have also sent your drospirenone-ethinyl estradiol (ELIER) 3-0.02 MG tablet medication to your pharmacy. For future medication refills, please contact your primary care clinic to schedule the above appointment. This can be requested via [employer] or by calling the clinic at [phone].    We greatly appreciate the opportunity to serve you. Thank you for trusting us with your health care.      Sincerely,    Your care team at [facility]

## 2019-08-31 NOTE — TELEPHONE ENCOUNTER
"Requested Prescriptions   Pending Prescriptions Disp Refills     drospirenone-ethinyl estradiol (ELIER) 3-0.02 MG tablet [Pharmacy Med Name: DROSPIRENONE/ETH ES TABS 28'S 3/20]  Last Written Prescription Date:  8/28/2018  Last Fill Quantity: 84 tabs,  # refills: 3   Last office visit: 2/22/2019 with prescribing provider:  Amari   Future Office Visit:     84 tablet 4     Sig: TAKE 1 TABLET DAILY       Contraceptives Protocol Passed - 8/31/2019 12:06 AM        Passed - Patient is not a current smoker if age is 35 or older        Passed - Recent (12 mo) or future (30 days) visit within the authorizing provider's specialty     Patient had office visit in the last 12 months or has a visit in the next 30 days with authorizing provider or within the authorizing provider's specialty.  See \"Patient Info\" tab in inbasket, or \"Choose Columns\" in Meds & Orders section of the refill encounter.              Passed - Medication is active on med list        Passed - No active pregnancy on record        Passed - No positive pregnancy test in past 12 months          "

## 2019-09-03 RX ORDER — DROSPIRENONE AND ETHINYL ESTRADIOL 0.02-3(28)
1 KIT ORAL DAILY
Qty: 28 TABLET | Refills: 0 | Status: SHIPPED | OUTPATIENT
Start: 2019-09-03 | End: 2019-10-08

## 2019-09-03 NOTE — TELEPHONE ENCOUNTER
HP Reception: Please contact patient, she is due for annual exam    Medication is being filled for 1 time refill only due to:  Patient needs to be seen because it has been more than one year since last visit. - had office visit in February for acute visit    Marsha Sams, RN  Triage Nurse

## 2019-09-03 NOTE — TELEPHONE ENCOUNTER
LM informing patient that RX was refilled. Patient is due for an annual physical  prior to additional refills given. Sending letter.    Avis Bryant

## 2019-10-08 ENCOUNTER — OFFICE VISIT (OUTPATIENT)
Dept: OBGYN | Facility: CLINIC | Age: 29
End: 2019-10-08
Payer: COMMERCIAL

## 2019-10-08 VITALS
WEIGHT: 198 LBS | HEIGHT: 65 IN | BODY MASS INDEX: 32.99 KG/M2 | SYSTOLIC BLOOD PRESSURE: 138 MMHG | DIASTOLIC BLOOD PRESSURE: 84 MMHG

## 2019-10-08 DIAGNOSIS — Z01.419 ENCOUNTER FOR GYNECOLOGICAL EXAMINATION WITHOUT ABNORMAL FINDING: Primary | ICD-10-CM

## 2019-10-08 DIAGNOSIS — Z23 NEED FOR PROPHYLACTIC VACCINATION AND INOCULATION AGAINST INFLUENZA: ICD-10-CM

## 2019-10-08 DIAGNOSIS — Z80.8 FAMILY HISTORY OF SKIN CANCER: ICD-10-CM

## 2019-10-08 PROCEDURE — 90686 IIV4 VACC NO PRSV 0.5 ML IM: CPT | Performed by: OBSTETRICS & GYNECOLOGY

## 2019-10-08 PROCEDURE — 99385 PREV VISIT NEW AGE 18-39: CPT | Mod: 25 | Performed by: OBSTETRICS & GYNECOLOGY

## 2019-10-08 PROCEDURE — 90471 IMMUNIZATION ADMIN: CPT | Performed by: OBSTETRICS & GYNECOLOGY

## 2019-10-08 RX ORDER — DROSPIRENONE AND ETHINYL ESTRADIOL 0.02-3(28)
1 KIT ORAL DAILY
Qty: 112 TABLET | Refills: 4 | Status: SHIPPED | OUTPATIENT
Start: 2019-10-08 | End: 2020-11-24

## 2019-10-08 SDOH — HEALTH STABILITY: MENTAL HEALTH: HOW OFTEN DO YOU HAVE 6 OR MORE DRINKS ON ONE OCCASION?: NEVER

## 2019-10-08 SDOH — HEALTH STABILITY: MENTAL HEALTH: HOW MANY STANDARD DRINKS CONTAINING ALCOHOL DO YOU HAVE ON A TYPICAL DAY?: 1 OR 2

## 2019-10-08 SDOH — HEALTH STABILITY: MENTAL HEALTH: HOW OFTEN DO YOU HAVE A DRINK CONTAINING ALCOHOL?: MONTHLY OR LESS

## 2019-10-08 ASSESSMENT — MIFFLIN-ST. JEOR: SCORE: 1629

## 2019-10-08 NOTE — NURSING NOTE
Clinic Administered Medication Documentation    MEDICATION LIST:   Injectable Medication Documentation    Patient was given flu vaccine. Prior to medication administration, verified patients identity using patient s name and date of birth. Please see MAR and medication order for additional information. Patient instructed to remain in clinic for 15 minutes.      Was entire vial of medication used? Yes  Vial/Syringe: Single dose vial  Expiration Date:  6/30/20  Was this medication supplied by the patient? No

## 2019-10-08 NOTE — PROGRESS NOTES
Kymberly is a 29 year old . female who presents for annual exam.     Menses are regular q 28-30 days and normal lasting 6 days.  Menses flow: normal.  Patient's last menstrual period was 2019.. Using oral contraceptives for contraception.  She is not currently considering pregnancy.  Besides routine health maintenance,  she would like to discuss refill elvin.  for almost 6 years. Not thinking about having any kids.  Has been doing monthly withdrawal bleeds.   Had cholesterol checked in . Wants to lose some weight but since mother passed about 5 years ago, has had some issues and has not been able to get that going. 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   GYNECOLOGIC HISTORY:  Menarche:   Kymberly is sexually active with 1male partner(s) and is currently in monogamous relationship.    History sexually transmitted infections:No STD history  STI testing offered?  Declined  FLACA exposure: Unknown  History of abnormal Pap smear: NO - age 30- 65 PAP every 3 years recommended  Family history of breast CA: yes, mgm  Family history of uterine/ovarian CA: No    Family history of colon CA: No    HEALTH MAINTENANCE:  Cholesterol: (No results found for: CHOL History of abnormal lipids: No  Mammo: na . History of abnormal Mammo: Not applicable.  Regular Self Breast Exams: No  Calcium/Vitamin D intake: source:  dairy, dietary supplement(s) Adequate? Yes  TSH: (No results found for: TSH )  Pap; (No results found for: PAP )    HISTORY:  OB History    Para Term  AB Living   1 0 0 0 1 0   SAB TAB Ectopic Multiple Live Births   1 0 0 0 0      # Outcome Date GA Lbr Gene/2nd Weight Sex Delivery Anes PTL Lv   1 SAB              Past Medical History:   Diagnosis Date     Uncomplicated asthma      Past Surgical History:   Procedure Laterality Date     APPENDECTOMY       Family History   Problem Relation Age of Onset     Pancreatic Cancer Mother      Hypertension Mother      Other Cancer Mother          Pancreatic     Asthma Mother      Family History Negative Father      Hypertension Father      Other Cancer Father         Skin     Other Cancer Maternal Grandmother         Bladder     Depression Maternal Grandmother      Social History     Socioeconomic History     Marital status:      Spouse name: Not on file     Number of children: Not on file     Years of education: Not on file     Highest education level: Not on file   Occupational History     Not on file   Social Needs     Financial resource strain: Not on file     Food insecurity:     Worry: Not on file     Inability: Not on file     Transportation needs:     Medical: Not on file     Non-medical: Not on file   Tobacco Use     Smoking status: Never Smoker     Smokeless tobacco: Never Used   Substance and Sexual Activity     Alcohol use: Yes     Comment: rarely      Drug use: No     Sexual activity: Yes     Partners: Male     Birth control/protection: Pill   Lifestyle     Physical activity:     Days per week: Not on file     Minutes per session: Not on file     Stress: Not on file   Relationships     Social connections:     Talks on phone: Not on file     Gets together: Not on file     Attends Yazdanism service: Not on file     Active member of club or organization: Not on file     Attends meetings of clubs or organizations: Not on file     Relationship status: Not on file     Intimate partner violence:     Fear of current or ex partner: Not on file     Emotionally abused: Not on file     Physically abused: Not on file     Forced sexual activity: Not on file   Other Topics Concern     Parent/sibling w/ CABG, MI or angioplasty before 65F 55M? No   Social History Narrative     Not on file       Current Outpatient Medications:      albuterol (PROAIR HFA/PROVENTIL HFA/VENTOLIN HFA) 108 (90 BASE) MCG/ACT Inhaler, Inhale 2 puffs into the lungs every 6 hours, Disp: 1 Inhaler, Rfl: 3     drospirenone-ethinyl estradiol (ELIER) 3-0.02 MG tablet, Take 1 tablet by  "mouth daily ++Due for office visit before further refills ++, Disp: 28 tablet, Rfl: 0     Multiple Vitamins-Minerals (MULTIVITAMIN GUMMIES ADULT PO), , Disp: , Rfl:      VITAMIN D, CHOLECALCIFEROL, PO, Take by mouth daily, Disp: , Rfl:    No Known Allergies    Past medical, surgical, social and family history were reviewed and updated in EPIC.      EXAM:  /84   Ht 1.651 m (5' 5\")   Wt 89.8 kg (198 lb)   LMP 09/24/2019   BMI 32.95 kg/m     BMI: Body mass index is 32.95 kg/m .  Constitutional: healthy, alert and no distress  Head: Normocephalic. No masses, lesions, tenderness or abnormalities  Neck: Neck supple. Trachea midline. No adenopathy. Thyroid symmetric, normal size.   Cardiovascular: RRR.   Respiratory: Negative.   Breast: Breasts reveal mild symmetric fibrocystic densities, but there are no dominant, discrete, fixed or suspicious masses found.  Gastrointestinal: Abdomen soft, non-tender, non-distended. No masses, organomegaly.  :  Vulva:  No external lesions, normal female hair distribution, no inguinal adenopathy.    Urethra:  Midline, non-tender, well supported, no discharge  Vagina:  Moist, pink, no abnormal discharge, no lesions  Uterus:  Normal size, , non-tender, freely mobile  Ovaries:  No masses appreciated, non-tender, mobile  Rectal Exam: deferred  Musculoskeletal: extremities normal  Skin: no suspicious lesions or rashes  Psychiatric: Affect appropriate, cooperative,mentation appears normal.     COUNSELING:   Reviewed preventive health counseling, as reflected in patient instructions       Regular exercise       Healthy diet/nutrition       Contraception   reports that she has never smoked. She has never used smokeless tobacco.    Body mass index is 32.95 kg/m .  Weight management plan: Discussed not gaining weight and possibly losing 10%, 19 pounds, in the next year  FRAX Risk Assessment    ASSESSMENT:  29 year old female with satisfactory annual exam  (Z01.419) Encounter for " gynecological examination without abnormal finding  (primary encounter diagnosis)  Comment: OCP  Plan: drospirenone-ethinyl estradiol (ELIER) 3-0.02 MG         tablet        Refill of OCP was done and continuous use was also discussed.  Patient was given more pills    Plan check cholesterol and due for Pap smear in 2020    (Z23) Need for prophylactic vaccination and inoculation against influenza  Plan: INFLUENZA VACCINE IM > 6 MONTHS VALENT IIV4         [13843], Vaccine Administration, Initial         [26751]    (Z80.8) Family history of skin cancer  Comment: Father with melanoma  Plan: DERMATOLOGY REFERRAL        Discussed yearly skin checks and referral was done for dermatology        Najma Urena MD

## 2019-10-08 NOTE — PATIENT INSTRUCTIONS
The first year you do something continuous (pill, patch or ring) you will have the same number of bleeding days as if you had taken a week off each month, but the bleeding days will be at random times.   As long as you are okay with that, you can start trying continuous therapy.    There are 2 ways to take the pill/patch/ring continuously:  1) take active tablets for 3 weeks, or patches for 3 weeks or ring for 3 weeks, then instead of one week off do another 1-2 months of active pill/patch or ring.   (ie you will be on active hormones for 6 or 9 weeks and then do one week off so skipping periods)     2) you can take active tablets/patch or ring until you have red bleeding, then take 4-7 days off of hormone and restart.   Bleeding will happen at random times.    You never want to go more than 7 days off a hormonal therapy or you can get pregnant, so just make sure NO MORE THAN 7 DAYS OFF.  :)

## 2020-03-20 ENCOUNTER — MYC REFILL (OUTPATIENT)
Dept: FAMILY MEDICINE | Facility: CLINIC | Age: 30
End: 2020-03-20

## 2020-03-20 DIAGNOSIS — J45.21 MILD INTERMITTENT ASTHMA WITH EXACERBATION: ICD-10-CM

## 2020-03-21 NOTE — TELEPHONE ENCOUNTER
"Requested Prescriptions   Pending Prescriptions Disp Refills     albuterol (PROAIR HFA/PROVENTIL HFA/VENTOLIN HFA) 108 (90 Base) MCG/ACT inhaler 1 Inhaler 3     Sig: Inhale 2 puffs into the lungs every 6 hours         Last Written Prescription Date:  3/1/18  Last Fill Quantity: 1,   # refills: 3  Last Office Visit: 2/22/19 - Amari  Future Office visit:       Routing refill request to provider for review/approval because:  han is not a provider of our clinic      Asthma Maintenance Inhalers - Anticholinergics Failed - 3/20/2020  5:42 PM        Failed - Asthma control assessment score within normal limits in last 6 months     Please review ACT score.     ACT Total Scores 6/19/2018 2/22/2019   ACT TOTAL SCORE (Goal Greater than or Equal to 20) 22 24   In the past 12 months, how many times did you visit the emergency room for your asthma without being admitted to the hospital? 0 0   In the past 12 months, how many times were you hospitalized overnight because of your asthma? 0 0               Failed - Recent (6 mo) or future (30 days) visit within the authorizing provider's specialty     Patient had office visit in the last 6 months or has a visit in the next 30 days with authorizing provider or within the authorizing provider's specialty.  See \"Patient Info\" tab in inbasket, or \"Choose Columns\" in Meds & Orders section of the refill encounter.            Passed - Patient is age 12 years or older        Passed - Medication is active on med list       Short-Acting Beta Agonist Inhalers Protocol  Failed - 3/20/2020  5:42 PM        Failed - Asthma control assessment score within normal limits in last 6 months     Please review ACT score.           Failed - Recent (6 mo) or future (30 days) visit within the authorizing provider's specialty     Patient had office visit in the last 6 months or has a visit in the next 30 days with authorizing provider or within the authorizing provider's specialty.  See \"Patient Info\" " "tab in inbasket, or \"Choose Columns\" in Meds & Orders section of the refill encounter.            Passed - Patient is age 12 or older        Passed - Medication is active on med list             "

## 2020-03-23 RX ORDER — ALBUTEROL SULFATE 90 UG/1
2 AEROSOL, METERED RESPIRATORY (INHALATION) EVERY 6 HOURS
Qty: 1 INHALER | Refills: 0 | Status: SHIPPED | OUTPATIENT
Start: 2020-03-23 | End: 2023-12-13

## 2020-11-23 ENCOUNTER — E-VISIT (OUTPATIENT)
Dept: FAMILY MEDICINE | Facility: CLINIC | Age: 30
End: 2020-11-23
Payer: COMMERCIAL

## 2020-11-23 DIAGNOSIS — Z01.419 ENCOUNTER FOR GYNECOLOGICAL EXAMINATION WITHOUT ABNORMAL FINDING: ICD-10-CM

## 2020-11-23 PROCEDURE — 99421 OL DIG E/M SVC 5-10 MIN: CPT | Performed by: NURSE PRACTITIONER

## 2020-11-24 RX ORDER — DROSPIRENONE AND ETHINYL ESTRADIOL 0.02-3(28)
1 KIT ORAL DAILY
Qty: 112 TABLET | Refills: 1 | Status: SHIPPED | OUTPATIENT
Start: 2020-11-24 | End: 2021-05-02

## 2020-12-27 ENCOUNTER — HEALTH MAINTENANCE LETTER (OUTPATIENT)
Age: 30
End: 2020-12-27

## 2021-05-01 DIAGNOSIS — Z01.419 ENCOUNTER FOR GYNECOLOGICAL EXAMINATION WITHOUT ABNORMAL FINDING: ICD-10-CM

## 2021-05-02 RX ORDER — DROSPIRENONE AND ETHINYL ESTRADIOL 0.02-3(28)
KIT ORAL
Qty: 112 TABLET | Refills: 0 | Status: SHIPPED | OUTPATIENT
Start: 2021-05-02 | End: 2023-12-13

## 2021-05-02 NOTE — TELEPHONE ENCOUNTER
" ,  --Please contact patient and ask to schedule a physical with Amari as planned in last visit.      --Last visit:  11/23/2020 - \"Yes, I can refill this given the surge in COVID cases.  You are due for a physical and pap so I will refill it for 6 months we can reevaluate then!.      ---Prescription approved per [facility] Refill Protocol.       Adwoa Flores RN BSN     [facility]                      "

## 2021-10-09 ENCOUNTER — HEALTH MAINTENANCE LETTER (OUTPATIENT)
Age: 31
End: 2021-10-09

## 2022-01-29 ENCOUNTER — HEALTH MAINTENANCE LETTER (OUTPATIENT)
Age: 32
End: 2022-01-29

## 2022-09-17 ENCOUNTER — HEALTH MAINTENANCE LETTER (OUTPATIENT)
Age: 32
End: 2022-09-17

## 2023-05-06 ENCOUNTER — HEALTH MAINTENANCE LETTER (OUTPATIENT)
Age: 33
End: 2023-05-06

## 2023-12-13 ENCOUNTER — OFFICE VISIT (OUTPATIENT)
Dept: FAMILY MEDICINE | Facility: CLINIC | Age: 33
End: 2023-12-13
Payer: COMMERCIAL

## 2023-12-13 VITALS
BODY MASS INDEX: 33.29 KG/M2 | HEIGHT: 64 IN | OXYGEN SATURATION: 97 % | TEMPERATURE: 98.1 F | RESPIRATION RATE: 16 BRPM | SYSTOLIC BLOOD PRESSURE: 144 MMHG | WEIGHT: 195 LBS | DIASTOLIC BLOOD PRESSURE: 88 MMHG | HEART RATE: 95 BPM

## 2023-12-13 DIAGNOSIS — J45.21 MILD INTERMITTENT ASTHMA WITH EXACERBATION: ICD-10-CM

## 2023-12-13 DIAGNOSIS — F41.9 ANXIETY: ICD-10-CM

## 2023-12-13 DIAGNOSIS — Z01.419 ENCOUNTER FOR GYNECOLOGICAL EXAMINATION WITHOUT ABNORMAL FINDING: ICD-10-CM

## 2023-12-13 DIAGNOSIS — Z00.00 PHYSICAL EXAM, ANNUAL: Primary | ICD-10-CM

## 2023-12-13 DIAGNOSIS — R41.840 CONCENTRATION DEFICIT: ICD-10-CM

## 2023-12-13 DIAGNOSIS — R03.0 ELEVATED BP WITHOUT DIAGNOSIS OF HYPERTENSION: ICD-10-CM

## 2023-12-13 DIAGNOSIS — Z30.41 ENCOUNTER FOR SURVEILLANCE OF CONTRACEPTIVE PILLS: ICD-10-CM

## 2023-12-13 LAB
CHOLEST SERPL-MCNC: 239 MG/DL
FASTING STATUS PATIENT QL REPORTED: ABNORMAL
HBA1C MFR BLD: 5.1 % (ref 0–5.6)
HDLC SERPL-MCNC: 74 MG/DL
LDLC SERPL CALC-MCNC: 141 MG/DL
NONHDLC SERPL-MCNC: 165 MG/DL
TRIGL SERPL-MCNC: 121 MG/DL

## 2023-12-13 PROCEDURE — 99214 OFFICE O/P EST MOD 30 MIN: CPT | Mod: 25 | Performed by: PHYSICIAN ASSISTANT

## 2023-12-13 PROCEDURE — G0145 SCR C/V CYTO,THINLAYER,RESCR: HCPCS | Performed by: PHYSICIAN ASSISTANT

## 2023-12-13 PROCEDURE — 87624 HPV HI-RISK TYP POOLED RSLT: CPT | Performed by: PHYSICIAN ASSISTANT

## 2023-12-13 PROCEDURE — 36415 COLL VENOUS BLD VENIPUNCTURE: CPT | Performed by: PHYSICIAN ASSISTANT

## 2023-12-13 PROCEDURE — 80061 LIPID PANEL: CPT | Performed by: PHYSICIAN ASSISTANT

## 2023-12-13 PROCEDURE — 83036 HEMOGLOBIN GLYCOSYLATED A1C: CPT | Performed by: PHYSICIAN ASSISTANT

## 2023-12-13 PROCEDURE — 99385 PREV VISIT NEW AGE 18-39: CPT | Performed by: PHYSICIAN ASSISTANT

## 2023-12-13 RX ORDER — DROSPIRENONE AND ETHINYL ESTRADIOL 0.02-3(28)
KIT ORAL
Qty: 112 TABLET | Refills: 3 | Status: SHIPPED | OUTPATIENT
Start: 2023-12-13

## 2023-12-13 RX ORDER — ALBUTEROL SULFATE 90 UG/1
2 AEROSOL, METERED RESPIRATORY (INHALATION) EVERY 6 HOURS
Qty: 18 G | Refills: 3 | Status: SHIPPED | OUTPATIENT
Start: 2023-12-13

## 2023-12-13 ASSESSMENT — PATIENT HEALTH QUESTIONNAIRE - PHQ9
SUM OF ALL RESPONSES TO PHQ QUESTIONS 1-9: 10
SUM OF ALL RESPONSES TO PHQ QUESTIONS 1-9: 10
10. IF YOU CHECKED OFF ANY PROBLEMS, HOW DIFFICULT HAVE THESE PROBLEMS MADE IT FOR YOU TO DO YOUR WORK, TAKE CARE OF THINGS AT HOME, OR GET ALONG WITH OTHER PEOPLE: SOMEWHAT DIFFICULT

## 2023-12-13 ASSESSMENT — ASTHMA QUESTIONNAIRES: ACT_TOTALSCORE: 22

## 2023-12-13 NOTE — PROGRESS NOTES
SUBJECTIVE:   Kymberly is a 33 year old, presenting for the following:  Asthma (Pt is for yearly check up and asthma, pt is interested in birth control from provider instead of online.)        12/13/2023    11:34 AM   Additional Questions   Roomed by Rachel   Accompanied by self         -h/o lilaa, well controlled with albuterol    -on OCPs, skips her period.  Needs refills    -BP elevated.  Improved on recheck.  Patient denies any exertional chest pain, dyspnea, palpitations, syncope, orthopnea, edema or paroxysmal nocturnal dyspnea.     -having increased stress and anxiety.  Hard time of year for her.  Mother passed away from pancreatic cancer this time of year 10 years ago.      History of Present Illness     Asthma:  She presents for follow up of asthma.  She has some cough, no wheezing, and no shortness of breath. She is not using a relief medication.  She does not have a controller medication. Patient is aware of the following triggers: same as previous visit, cold air, dust mites, exercise or sports, mold, smoke, strong odors and fumes and upper respiratory infections. The patient has not had a visit to the Emergency Room, Urgent Care or Hospital due to asthma since the last clinic visit.     She eats 2-3 servings of fruits and vegetables daily.She consumes 1 sweetened beverage(s) daily.She exercises with enough effort to increase her heart rate 20 to 29 minutes per day.  She exercises with enough effort to increase her heart rate 4 days per week.   She is taking medications regularly.      Today's PHQ-9 Score:       12/13/2023     8:27 AM   PHQ-9 SCORE   PHQ-9 Total Score MyChart 10 (Moderate depression)   PHQ-9 Total Score 10       Social History     Tobacco Use    Smoking status: Never    Smokeless tobacco: Never   Substance Use Topics    Alcohol use: Yes     Comment: rarely               No data to display              Reviewed orders with patient.  Reviewed health maintenance and updated orders  "accordingly - Yes  Lab work is in process      FHS-7:        No data to display              Patient under 40 years of age: Routine Mammogram Screening not recommended.   Pertinent mammograms are reviewed under the imaging tab.    History of abnormal Pap smear: NO - age 30-65 PAP every 5 years with negative HPV co-testing recommended     Reviewed and updated as needed this visit by clinical staff   Tobacco  Allergies  Meds  Problems  Med Hx  Surg Hx  Fam Hx          Reviewed and updated as needed this visit by Provider   Tobacco  Allergies  Meds  Problems  Med Hx  Surg Hx  Fam Hx           Review of Systems  CONSTITUTIONAL: NEGATIVE for fever, chills, change in weight  INTEGUMENTARU/SKIN: NEGATIVE for worrisome rashes, moles or lesions  EYES: NEGATIVE for vision changes or irritation  ENT: NEGATIVE for ear, mouth and throat problems  RESP: NEGATIVE for significant cough or SOB  BREAST: NEGATIVE for masses, tenderness or discharge  CV: NEGATIVE for chest pain, palpitations or peripheral edema  GI: NEGATIVE for nausea, abdominal pain, heartburn, or change in bowel habits  : NEGATIVE for unusual urinary or vaginal symptoms. Periods are regular.  MUSCULOSKELETAL: NEGATIVE for significant arthralgias or myalgia  NEURO: NEGATIVE for weakness, dizziness or paresthesias  PSYCHIATRIC: NEGATIVE for changes in mood or affect     OBJECTIVE:   BP (!) 144/88   Pulse 95   Temp 98.1  F (36.7  C) (Temporal)   Resp 16   Ht 1.63 m (5' 4.17\")   Wt 88.5 kg (195 lb)   LMP  (LMP Unknown)   SpO2 97%   BMI 33.29 kg/m    Physical Exam  GENERAL: healthy, alert and no distress  EYES: Eyes grossly normal to inspection, PERRL and conjunctivae and sclerae normal  HENT: ear canals and TM's normal, nose and mouth without ulcers or lesions  NECK: no adenopathy, no asymmetry, masses, or scars and thyroid normal to palpation  RESP: lungs clear to auscultation - no rales, rhonchi or wheezes  BREAST: normal without masses, " tenderness or nipple discharge and no palpable axillary masses or adenopathy  CV: regular rate and rhythm, normal S1 S2, no S3 or S4, no murmur, click or rub, no peripheral edema and peripheral pulses strong  ABDOMEN: soft, nontender, no hepatosplenomegaly, no masses and bowel sounds normal   (female): normal female external genitalia, normal urethral meatus, vaginal mucosa pink, moist, well rugated, and normal cervix/adnexa/uterus without masses or discharge  MS: no gross musculoskeletal defects noted, no edema  SKIN: no suspicious lesions or rashes  NEURO: Normal strength and tone, mentation intact and speech normal  PSYCH: mentation appears normal, affect normal/bright    Diagnostic Test Results:  Labs reviewed in Epic    ASSESSMENT/PLAN:   (Z00.00) Physical exam, annual  (primary encounter diagnosis)  Comment:   Plan: Gynecologic Cytology (PAP), Lipid Profile         (Chol, Trig, HDL, LDL calc), Hemoglobin A1c            (J45.21) Mild intermittent asthma with exacerbation  Comment:   -stable, managed with albuterol  Plan: albuterol (PROAIR HFA/PROVENTIL HFA/VENTOLIN         HFA) 108 (90 Base) MCG/ACT inhaler            (Z01.419) Encounter for gynecological examination without abnormal finding  Comment:   Plan: drospirenone-ethinyl estradiol (ELIER) 3-0.02 MG         tablet            (R41.840) Concentration deficit  Comment:   -sent referral for mental health/ADHD eval  Plan: F2    (F41.9) Anxiety  Comment:   Plan: Adult Mental Health  Referral            (Z30.41) Encounter for surveillance of contraceptive pills  Comment:   Plan:     (R03.0) Elevated BP without diagnosis of hypertension  Comment:   -imprvoed on recheck  -will monitor outside of the clinic  -may consider changing from OCPs to other if BP remains elevated  Plan:         Depression Screening Follow Up        12/13/2023     8:27 AM   PHQ   PHQ-9 Total Score 10   Q9: Thoughts of better off dead/self-harm past 2 weeks Not at all       Follow  "Up Actions Taken  Patient counseled, no additional follow up at this time.       COUNSELING:  Reviewed preventive health counseling, as reflected in patient instructions      BMI:   Estimated body mass index is 33.29 kg/m  as calculated from the following:    Height as of this encounter: 1.63 m (5' 4.17\").    Weight as of this encounter: 88.5 kg (195 lb).       She reports that she has never smoked. She has never used smokeless tobacco.          Madeleine Moon PA-C  [facility]  Prior to immunization administration, verified patients identity using patient s name and date of birth. Please see Immunization Activity for additional information.     Screening Questionnaire for Adult Immunization    Are you sick today?   No   Do you have allergies to medications, food, a vaccine component or latex?   No   Have you ever had a serious reaction after receiving a vaccination?   No   Do you have a long-term health problem with heart, lung, kidney, or metabolic disease (e.g., diabetes), asthma, a blood disorder, no spleen, complement component deficiency, a cochlear implant, or a spinal fluid leak?  Are you on long-term aspirin therapy?   Yes   Do you have cancer, leukemia, HIV/AIDS, or any other immune system problem?   No   Do you have a parent, brother, or sister with an immune system problem?   No   In the past 3 months, have you taken medications that affect  your immune system, such as prednisone, other steroids, or anticancer drugs; drugs for the treatment of rheumatoid arthritis, Crohn s disease, or psoriasis; or have you had radiation treatments?   No   Have you had a seizure, or a brain or other nervous system problem?   No   During the past year, have you received a transfusion of blood or blood    products, or been given immune (gamma) globulin or antiviral drug?   No   For women: Are you pregnant or is there a chance you could become       pregnant during the next month?   No "   Have you received any vaccinations in the past 4 weeks?   No     Immunization questionnaire was positive for at least one answer.  Notified .      Patient instructed to remain in clinic for 15 minutes afterwards, and to report any adverse reactions.     Screening performed by Rachel Evans MA on 12/13/2023 at 11:39 AM.

## 2023-12-15 LAB
BKR LAB AP GYN ADEQUACY: NORMAL
BKR LAB AP GYN INTERPRETATION: NORMAL
BKR LAB AP HPV REFLEX: NORMAL
BKR LAB AP LMP: NORMAL
BKR LAB AP PREVIOUS ABNORMAL: NORMAL
PATH REPORT.COMMENTS IMP SPEC: NORMAL
PATH REPORT.COMMENTS IMP SPEC: NORMAL
PATH REPORT.RELEVANT HX SPEC: NORMAL

## 2023-12-19 LAB
HUMAN PAPILLOMA VIRUS 16 DNA: NEGATIVE
HUMAN PAPILLOMA VIRUS 18 DNA: NEGATIVE
HUMAN PAPILLOMA VIRUS FINAL DIAGNOSIS: NORMAL
HUMAN PAPILLOMA VIRUS OTHER HR: NEGATIVE

## 2023-12-28 ENCOUNTER — MYC MEDICAL ADVICE (OUTPATIENT)
Dept: FAMILY MEDICINE | Facility: CLINIC | Age: 33
End: 2023-12-28
Payer: COMMERCIAL

## 2025-02-09 ENCOUNTER — HEALTH MAINTENANCE LETTER (OUTPATIENT)
Age: 35
End: 2025-02-09